# Patient Record
Sex: MALE | Race: BLACK OR AFRICAN AMERICAN | NOT HISPANIC OR LATINO | Employment: OTHER | URBAN - METROPOLITAN AREA
[De-identification: names, ages, dates, MRNs, and addresses within clinical notes are randomized per-mention and may not be internally consistent; named-entity substitution may affect disease eponyms.]

---

## 2022-05-10 PROBLEM — D56.3 BETA THALASSEMIA TRAIT: Status: ACTIVE | Noted: 2022-05-10

## 2022-05-10 NOTE — PROGRESS NOTES
Patient ID: Jeovanny Leigh is a 72 y.o. male.    Chief Complaint: Anemia      History of Present Illness  Chief complaint: anemia    HPI: 73 y/o M w/ PMHx of CKD3, HTN, HLD, CAD, CVA, 2ndary hyperparathyroidism, AAA, BPH, COPD, KASSY on CPAP referred to [facility] for anemia    Today 5/11/22: Patient here today with his wife for follow up visit. He is doing well overall. Denies fevers, chills, headaches, falls, weakness, fatigue, SOB, WOOD, CP, n/v/c/d, melena, hematochezia, hematuria. He has chronic arthritic pains and some CVA associated pains on left side of his body with minimal residual weakness on left. No other complaints or concerns reported.    PMHx: CKD3, HTN, HLD, CAD, CVA, 2ndary hyperparathyroidism, AAA, BPH, COPD, KASSY on CPAP  PSHx: PCI in 2013, lipoma  Social Hx: former smoker quit in 2013, smoked 1 ppd for 40 years, no ETOH, no drugs, no known occupational exposures  Family Hx: daughter: beta thal trait  Meds: reviewed  Allergies: ultram    Labs:  4/11/22 Cr 2.94, , iron 68, TIBC 273, Ferritin 189, folic acid 8.95, B12 429, CRP 0.04, WBC 6.21, RBC 4.45, Hgb 9.6, MCV 66.5, RDW 15.9, , Retic 2.03%, ESR 10, copper 80.4, EPO 22, SFLC ratio 1.96, kappa 61.36, lambda 31.23, hapto 151, SPEP w/ RADHIKA shows band in IgA lambda  3/18/22 Cr 2.82 Alb 4 TP 6.8 Ca 9.2 AlkPhos 90 Mg 2 Phos 3.6 WBC 5.5 RBC 4.49 Hg 9.6 MCV 68.2 RDW 16.4  ANC 3.19  2/14/22 Hg A 91.1% A2 7.3% ferritin 228  12/20/21 FOBT x3 neg  1/11/18 Hg 10.4 MCV 66.5 RBC 4.86  3/17/15 Hg 12 MCV 70 RBC 5.2  10/16/14 Cr 1.69 Hg 9.9 MCV 71 RBC 4.43 SPEP w/o RADHIKA neg UPEP 24hr neg JUDY neg    Imaging:  3/2/22 TTE LVEF 70%    Path: none      Review of Systems  CONSTITUTIONAL: no fevers, no chills, no weight loss, no fatigue, no weakness  HEMATOLOGIC: no abnormal bleeding, no abnormal bruising, no drenching night sweats  ONCOLOGIC: no new masses or lumps  HEENT: no vision loss, no tinnitus or hearing loss, no nose bleeding, no dysphagia, no  odynophagia  CVS: no chest pain, no palpitations, no dyspnea on exertion  RESP: no shortness of breath, no hemoptysis, no cough  GI: no nausea, no vomiting, no diarrhea, no constipation, no melena, no hematochezia, no hematemesis, no abdominal pain, no increase in abdominal girth  : no dysuria, no hematuria, no hesitancy, no scrotal swelling, no discharge  INTEGUMENT: no rashes, no abnormal bruising, no nail pitting, no hyperpigmentation  NEURO: no falls, no memory loss, no paresthesias or dysesthesias, no urofecal incontinence or retention, no loss of strength on any extremity  MSK: no back pain, no new joint pain but chronic arthritic pains, no joint swelling  PSYCH: no suicidal or homicidal ideation, no depression, no insomnia, no anhedonia  ENDOCRINE: no heat or cold intolerance, no polyuria, no polydipsia      Objective:      Physical Exam  Vitals:    05/11/22 1423   BP: (!) 150/91   Pulse: 77   Resp: 16   Temp: 99 °F (37.2 °C)       ECOG PS 1-2  GA: AAOx3, NAD  HEENT: NCAT, PERRLA, EOMI, no oral ulcers  LYMPH: no cervical, axillary or supraclavicular adenopathy  CVS: s1s2 RRR, no M/R/G  RESP: CTA b/l, no crackles, no wheezes or rhonchi  ABD: soft, NT, ND, BS+, no hepatosplenomegaly  EXT: no deformities, no pedal edema  SKIN: no rashes, no bruises or purpura, warm and dry  NEURO: normal mentation, strength 5/5 on all 4 extremities, no sensory deficits, walks with a holloway    Assessment:       Problem List Items Addressed This Visit        Oncology    Beta thalassemia trait - Primary            Plan:   Beta thalassemia trait is of no clinical consequence. Usually RBC will be 5.5-6 million, MCV in mid to low 60s, Hg 9-11 g/dl.  Mr Leigh also has CKD which likely leads to anemia of CKD as well. His RBC is ~4.5 million which is only minimally low  No specific intervention needed for beta thal trait. If any DEZ is considered at any point for anemia of CKD, treatment should be adjusted according to RBC counts  rather than Hg  His daughter, Mrs Arredondo, also has beta thal trait as well and follows with Dr Garcia  Faint band in IgA lambda noted on recent SPEP w/ RADHIKA, will repeat SPEP w/ RADHIKA and SFLC ratio in 3 months  RTC in 3 months with CBC, CMP, LDH, retic, SPEP w/ RADHIKA and SFLC 1 week prior to OV  Call if any questions or concerns  CC Dr. Jennifer Yañez, FNP-C

## 2022-05-11 ENCOUNTER — OFFICE VISIT (OUTPATIENT)
Dept: HEMATOLOGY/ONCOLOGY | Facility: CLINIC | Age: 72
End: 2022-05-11
Payer: COMMERCIAL

## 2022-05-11 VITALS
BODY MASS INDEX: 34.84 KG/M2 | HEIGHT: 71 IN | SYSTOLIC BLOOD PRESSURE: 150 MMHG | DIASTOLIC BLOOD PRESSURE: 91 MMHG | TEMPERATURE: 99 F | WEIGHT: 248.88 LBS | HEART RATE: 77 BPM | OXYGEN SATURATION: 95 % | RESPIRATION RATE: 16 BRPM

## 2022-05-11 DIAGNOSIS — D56.3 BETA THALASSEMIA TRAIT: Primary | ICD-10-CM

## 2022-05-11 PROCEDURE — 3077F SYST BP >= 140 MM HG: CPT | Mod: CPTII,,, | Performed by: NURSE PRACTITIONER

## 2022-05-11 PROCEDURE — 3288F PR FALLS RISK ASSESSMENT DOCUMENTED: ICD-10-PCS | Mod: CPTII,,, | Performed by: NURSE PRACTITIONER

## 2022-05-11 PROCEDURE — 4010F ACE/ARB THERAPY RXD/TAKEN: CPT | Mod: CPTII,,, | Performed by: NURSE PRACTITIONER

## 2022-05-11 PROCEDURE — 99213 PR OFFICE/OUTPT VISIT, EST, LEVL III, 20-29 MIN: ICD-10-PCS | Mod: ,,, | Performed by: NURSE PRACTITIONER

## 2022-05-11 PROCEDURE — 3080F DIAST BP >= 90 MM HG: CPT | Mod: CPTII,,, | Performed by: NURSE PRACTITIONER

## 2022-05-11 PROCEDURE — 4010F PR ACE/ARB THEARPY RXD/TAKEN: ICD-10-PCS | Mod: CPTII,,, | Performed by: NURSE PRACTITIONER

## 2022-05-11 PROCEDURE — 3080F PR MOST RECENT DIASTOLIC BLOOD PRESSURE >= 90 MM HG: ICD-10-PCS | Mod: CPTII,,, | Performed by: NURSE PRACTITIONER

## 2022-05-11 PROCEDURE — 1159F PR MEDICATION LIST DOCUMENTED IN MEDICAL RECORD: ICD-10-PCS | Mod: CPTII,,, | Performed by: NURSE PRACTITIONER

## 2022-05-11 PROCEDURE — 3008F BODY MASS INDEX DOCD: CPT | Mod: CPTII,,, | Performed by: NURSE PRACTITIONER

## 2022-05-11 PROCEDURE — 3288F FALL RISK ASSESSMENT DOCD: CPT | Mod: CPTII,,, | Performed by: NURSE PRACTITIONER

## 2022-05-11 PROCEDURE — 1101F PR PT FALLS ASSESS DOC 0-1 FALLS W/OUT INJ PAST YR: ICD-10-PCS | Mod: CPTII,,, | Performed by: NURSE PRACTITIONER

## 2022-05-11 PROCEDURE — 1126F AMNT PAIN NOTED NONE PRSNT: CPT | Mod: CPTII,,, | Performed by: NURSE PRACTITIONER

## 2022-05-11 PROCEDURE — 1101F PT FALLS ASSESS-DOCD LE1/YR: CPT | Mod: CPTII,,, | Performed by: NURSE PRACTITIONER

## 2022-05-11 PROCEDURE — 99213 OFFICE O/P EST LOW 20 MIN: CPT | Mod: ,,, | Performed by: NURSE PRACTITIONER

## 2022-05-11 PROCEDURE — 1159F MED LIST DOCD IN RCRD: CPT | Mod: CPTII,,, | Performed by: NURSE PRACTITIONER

## 2022-05-11 PROCEDURE — 3008F PR BODY MASS INDEX (BMI) DOCUMENTED: ICD-10-PCS | Mod: CPTII,,, | Performed by: NURSE PRACTITIONER

## 2022-05-11 PROCEDURE — 1126F PR PAIN SEVERITY QUANTIFIED, NO PAIN PRESENT: ICD-10-PCS | Mod: CPTII,,, | Performed by: NURSE PRACTITIONER

## 2022-05-11 PROCEDURE — 3077F PR MOST RECENT SYSTOLIC BLOOD PRESSURE >= 140 MM HG: ICD-10-PCS | Mod: CPTII,,, | Performed by: NURSE PRACTITIONER

## 2022-05-11 RX ORDER — CLOPIDOGREL BISULFATE 75 MG/1
TABLET ORAL
COMMUNITY
Start: 2022-04-12

## 2022-05-11 RX ORDER — NAPROXEN SODIUM 220 MG/1
81 TABLET, FILM COATED ORAL
COMMUNITY

## 2022-05-11 RX ORDER — CARVEDILOL 12.5 MG/1
12.5 TABLET ORAL
COMMUNITY
Start: 2022-04-11

## 2022-05-11 RX ORDER — ATORVASTATIN CALCIUM 40 MG/1
TABLET, FILM COATED ORAL
COMMUNITY
Start: 2022-04-21

## 2022-05-11 RX ORDER — TAMSULOSIN HYDROCHLORIDE 0.4 MG/1
CAPSULE ORAL
COMMUNITY
Start: 2022-02-28

## 2022-05-11 RX ORDER — OXYBUTYNIN CHLORIDE 10 MG/1
10 TABLET, EXTENDED RELEASE ORAL DAILY
COMMUNITY
Start: 2021-12-21

## 2022-05-11 RX ORDER — TERAZOSIN 2 MG/1
2 CAPSULE ORAL
COMMUNITY
Start: 2022-04-11

## 2022-05-11 RX ORDER — FUROSEMIDE 40 MG/1
40 TABLET ORAL DAILY
COMMUNITY
Start: 2022-03-02

## 2022-05-11 RX ORDER — PANTOPRAZOLE SODIUM 40 MG/1
TABLET, DELAYED RELEASE ORAL
COMMUNITY
Start: 2022-03-15

## 2022-05-11 RX ORDER — BACLOFEN 10 MG/1
TABLET ORAL
COMMUNITY
Start: 2021-12-14

## 2022-05-11 RX ORDER — AMLODIPINE BESYLATE 10 MG/1
TABLET ORAL
COMMUNITY
Start: 2022-04-21

## 2022-06-30 ENCOUNTER — HOSPITAL ENCOUNTER (OUTPATIENT)
Dept: TELEMEDICINE | Facility: HOSPITAL | Age: 72
Discharge: HOME | End: 2022-06-30
Payer: COMMERCIAL

## 2022-06-30 DIAGNOSIS — I63.9 ISCHEMIC STROKE: ICD-10-CM

## 2022-06-30 PROCEDURE — G0508 CRIT CARE TELEHEA CONSULT 60: HCPCS | Mod: GT,,, | Performed by: PSYCHIATRY & NEUROLOGY

## 2022-06-30 PROCEDURE — G0508 PR CRITICAL CARE TELEHLTH INITIAL CONSULT 60MIN: ICD-10-PCS | Mod: GT,,, | Performed by: PSYCHIATRY & NEUROLOGY

## 2022-06-30 NOTE — SUBJECTIVE & OBJECTIVE
Woke up with symptoms?: no    Recent bleeding noted: {Yes / No / Unknown:74}  Does the patient take any Blood Thinners? yes  Medications: Antiplatelets:  aspirin and clopidogrel/Plavix    History reviewed. No pertinent past medical history.  Past Surgical History:   Procedure Laterality Date    NECK SURGERY       Social History     Tobacco Use    Smoking status: Former Smoker    Smokeless tobacco: Never Used   Substance Use Topics    Alcohol use: Not Currently    Drug use: Never     Family History   Problem Relation Age of Onset    Stroke Mother     Heart attack Brother     Stroke Maternal Grandmother     Heart attack Paternal Grandfather      Current Outpatient Medications on File Prior to Encounter   Medication Sig Dispense Refill    amLODIPine (NORVASC) 10 MG tablet TAKE ONE TABLET BY MOUTH ONCE DAILY FOR PRESSURE (REPLACE LOTREL)      aspirin 81 MG Chew Take 81 mg by mouth.      atorvastatin (LIPITOR) 40 MG tablet TAKE ONE TABLET BY MOUTH ONCE DAILY AT NIGHT FOR CHOLESTEROL      baclofen (LIORESAL) 10 MG tablet TAKE ONE TABLET BY MOUTH TWICE DAILY AS NEEDED FOR MUSCLE STRAIN      carvediloL (COREG) 12.5 MG tablet Take 12.5 mg by mouth.      clopidogreL (PLAVIX) 75 mg tablet TAKE ONE TABLET BY MOUTH ONCE DAILY FOR CIRCULATION / stroke prevention      furosemide (LASIX) 40 MG tablet Take 40 mg by mouth once daily.      oxybutynin (DITROPAN-XL) 10 MG 24 hr tablet Take 10 mg by mouth once daily.      pantoprazole (PROTONIX) 40 MG tablet TAKE ONE TABLET BY MOUTH ONCE DAILY FOR ACID REFLUX      tamsulosin (FLOMAX) 0.4 mg Cap TAKE ONE CAPSULE BY MOUTH ONCE DAILY FOR prostate      terazosin (HYTRIN) 2 MG capsule Take 2 mg by mouth.       No current facility-administered medications on file prior to encounter.       Allergies: Tramadol     Review of Systems   All other systems reviewed and are negative.    Objective:   Vitals: There were no vitals taken for this visit. {TELESTROKE  VITALS:05018}    CT READ: Yes  No hemmorhage. No mass effect. No early infarct signs.  ; remote R subcortical, age indeterminate R ant limb IC, leukoaraiosis  Physical Exam  Vitals reviewed.

## 2022-07-01 DIAGNOSIS — E04.1 THYROID CYST: Primary | ICD-10-CM

## 2022-07-01 NOTE — CONSULTS
[facility]  Vascular Neurology  Comprehensive Stroke Center  TeleVascular Neurology Acute Consultation Note      Consults    Consulting Provider: FANNY MIN  Current Providers  No providers found    Patient Location: [facility] TELEMEDICINE ED [facility] Emergency Department  Spoke hospital nurse at bedside with patient assisting consultant.     Patient information was obtained from patient.         Assessment/Plan:       Diagnoses:   Ischemic stroke  Pure sensory ischemic stroke best localizing to R thalamus.  Not a tPA nor interventional candidate as NIHSS only 1.    Antithrombotics for secondary stroke prevention: Antiplatelets: Aspirin: 81 mg daily  Clopidogrel: 75 mg daily    Statins for secondary stroke prevention and hyperlipidemia, if present:   Statins: Atorvastatin- 80 mg daily    Aggressive risk factor modification: HTN, HLD, Diet, Exercise     Rehab efforts: The patient has been evaluated by a stroke team provider and the therapy needs have been fully considered based off the presenting complaints and exam findings. The following therapy evaluations are needed: PT evaluate and treat, OT evaluate and treat, SLP evaluate and treat    Diagnostics ordered/pending: CTA Head to assess vasculature , CTA Neck/Arch to assess vasculature, HgbA1C to assess blood glucose levels, Lipid Profile to assess cholesterol levels, MRI head without contrast to assess brain parenchyma, TTE to assess cardiac function/status     VTE prophylaxis: Enoxaparin 40 mg SQ every 24 hours    BP parameters: Infarct: No intervention, SBP <220    IVFs: NS @ 75cc/hr        STROKE DOCUMENTATION     Acute Stroke Times:   Acute Stroke Times   Symptom Onset Date: 06/30/22  Symptom Onset Time: 1815  Stroke Team Arrival Time: 1850  CT Interpretation Time: 1855    NIH Scale:  Interval: baseline  1a. Level of Consciousness: 0-->Alert, keenly responsive  1b. LOC Questions:  0-->Answers both questions correctly  1c. LOC Commands: 0-->Performs both tasks correctly  2. Best Gaze: 0-->Normal  3. Visual: 0-->No visual loss  4. Facial Palsy: 0-->Normal symmetrical movements  5a. Motor Arm, Left: 0-->No drift, limb holds 90 (or 45) degrees for full 10 secs  5b. Motor Arm, Right: 0-->No drift, limb holds 90 (or 45) degrees for full 10 secs  6a. Motor Leg, Left: 0-->No drift, leg holds 30 degree position for full 5 secs  6b. Motor Leg, Right: 0-->No drift, leg holds 30 degree position for full 5 secs  7. Limb Ataxia: 0-->Absent  8. Sensory: 1-->Mild-to-moderate sensory loss, patient feels pinprick is less sharp or is dull on the affected side, or there is a loss of superficial pain with pinprick, but patient is aware of being touched  9. Best Language: 0-->No aphasia, normal  10. Dysarthria: 0-->Normal  11. Extinction and Inattention (formerly Neglect): 0-->No abnormality  Total (NIH Stroke Scale): 1     Modified [place] Coma Scale:    ABCD2 Score:    [id number] Score:   HAS -BLED Score:   ICH Score:   Hunt & De La Torre Classification:       There were no vitals taken for this visit.  Alteplase Eligible?: No  Alteplase Recommendation: Alteplase not recommended due to Mild Non-Disabling Symptoms  Possible Interventional Revascularization Candidate? No; no significant neurologic deficit (NIHSS <6)  and No; at this time symptoms not suggestive of large vessel occlusion    Disposition Recommendation: admit to inpatient    Subjective:     History of Present Illness:  71 y/o AAM with L sided numbness onset 6:15p.  He is improving but not back to baseline.         Woke up with symptoms?: no    Recent bleeding noted: no  Does the patient take any Blood Thinners? yes  Medications: Antiplatelets:  aspirin and clopidogrel/Plavix    History reviewed. No pertinent past medical history.  Past Surgical History:   Procedure Laterality Date    NECK SURGERY       Social History     Tobacco Use    Smoking  status: Former Smoker    Smokeless tobacco: Never Used   Substance Use Topics    Alcohol use: Not Currently    Drug use: Never     Family History   Problem Relation Age of Onset    Stroke Mother     Heart attack Brother     Stroke Maternal Grandmother     Heart attack Paternal Grandfather      Current Outpatient Medications on File Prior to Encounter   Medication Sig Dispense Refill    amLODIPine (NORVASC) 10 MG tablet TAKE ONE TABLET BY MOUTH ONCE DAILY FOR PRESSURE (REPLACE LOTREL)      aspirin 81 MG Chew Take 81 mg by mouth.      atorvastatin (LIPITOR) 40 MG tablet TAKE ONE TABLET BY MOUTH ONCE DAILY AT NIGHT FOR CHOLESTEROL      baclofen (LIORESAL) 10 MG tablet TAKE ONE TABLET BY MOUTH TWICE DAILY AS NEEDED FOR MUSCLE STRAIN      carvediloL (COREG) 12.5 MG tablet Take 12.5 mg by mouth.      clopidogreL (PLAVIX) 75 mg tablet TAKE ONE TABLET BY MOUTH ONCE DAILY FOR CIRCULATION / stroke prevention      furosemide (LASIX) 40 MG tablet Take 40 mg by mouth once daily.      oxybutynin (DITROPAN-XL) 10 MG 24 hr tablet Take 10 mg by mouth once daily.      pantoprazole (PROTONIX) 40 MG tablet TAKE ONE TABLET BY MOUTH ONCE DAILY FOR ACID REFLUX      tamsulosin (FLOMAX) 0.4 mg Cap TAKE ONE CAPSULE BY MOUTH ONCE DAILY FOR prostate      terazosin (HYTRIN) 2 MG capsule Take 2 mg by mouth.       No current facility-administered medications on file prior to encounter.       Allergies: Tramadol     Review of Systems   All other systems reviewed and are negative.    Objective:   Vitals:     CT READ: Yes  No hemmorhage. No mass effect. No early infarct signs.  ; remote R subcortical, age indeterminate R ant limb IC, leukoaraiosis  Physical Exam  Vitals reviewed.               Recommended the emergency room physician to have a brief discussion with the patient and/or family if available regarding the  risks and benefits of treatment, and to briefly document the occurrence of that discussion in his clinical  encounter note.     The attending portion of this evaluation, treatment, and documentation was performed per Tato Rg MD via audiovisual.    Billing code:  (time dependent stroke, complex case, unstable patient, hemorrhages, any intervention, some mimics)    · This patient has a very critical neurological condition/illness, with very high morbidity and mortality.  · There is a very high probability for acute neurological change leading to clinical and possibly life-threatening deterioration requiring highest level of physician preparedness for urgent intervention.  · There is possibility that this condition will require treatment with high risk medications as quickly as possible.  · There is also a possibility that the patient may benefit from further, more advance complex therapies (e.g. endovascular therapy) that will require prompt diagnosis and care.  · Care was coordinated with other physicians involved in the patient's care.  · Radiologic studies and laboratory data were reviewed and interpreted, and plan of care was re-assessed based on the results.  · Diagnosis, treatment options and prognosis may have been discussed with the patient and/or family members or caregiver.  · Further advanced medical management and further evaluation is warranted for his care.      In your opinion, this was a: Tier 1 Van Negative    Consult End Time: 7:05 PM     Tato Rg MD  Comprehensive Stroke Center  Vascular Neurology   [facility]

## 2022-07-01 NOTE — ASSESSMENT & PLAN NOTE
Pure sensory ischemic stroke best localizing to R thalamus.  Not a tPA nor interventional candidate as NIHSS only 1.    Antithrombotics for secondary stroke prevention: Antiplatelets: Aspirin: 81 mg daily  Clopidogrel: 75 mg daily    Statins for secondary stroke prevention and hyperlipidemia, if present:   Statins: Atorvastatin- 80 mg daily    Aggressive risk factor modification: HTN, HLD, Diet, Exercise     Rehab efforts: The patient has been evaluated by a stroke team provider and the therapy needs have been fully considered based off the presenting complaints and exam findings. The following therapy evaluations are needed: PT evaluate and treat, OT evaluate and treat, SLP evaluate and treat    Diagnostics ordered/pending: CTA Head to assess vasculature , CTA Neck/Arch to assess vasculature, HgbA1C to assess blood glucose levels, Lipid Profile to assess cholesterol levels, MRI head without contrast to assess brain parenchyma, TTE to assess cardiac function/status     VTE prophylaxis: Enoxaparin 40 mg SQ every 24 hours    BP parameters: Infarct: No intervention, SBP <220    IVFs: NS @ 75cc/hr

## 2022-08-24 NOTE — PROGRESS NOTES
Patient ID: Jeovanny Leigh is a 72 y.o. male.    Chief Complaint: Anemia (Follow up with labs)      History of Present Illness  Chief complaint: anemia    HPI: 73 y/o M w/ PMHx of CKD3, HTN, HLD, CAD, CVA, 2ndary hyperparathyroidism, AAA, BPH, COPD, KASSY on CPAP referred to [facility] for anemia    Today 8/29/22: Patient here today with his wife for follow up visit. He is doing well overall. Denies fevers, chills, headaches, falls, weakness, SOB, WOOD, CP, n/v/c/d, melena, hematochezia, hematuria. He has chronic arthritic pains and some CVA associated pains on left side of his body with minimal residual weakness on left. He reports some fatigue since having Covid 4 weeks ago. No other complaints or concerns reported.    PMHx: CKD3, HTN, HLD, CAD, CVA, 2ndary hyperparathyroidism, AAA, BPH, COPD, KASSY on CPAP  PSHx: PCI in 2013, lipoma  Social Hx: former smoker quit in 2013, smoked 1 ppd for 40 years, no ETOH, no drugs, no known occupational exposures  Family Hx: daughter: beta thal trait  Meds: reviewed  Allergies: ultram    Labs:  8/11/22 Cr 2.67, Alb 3.7, TP 6.7, , WBC 4.45, Hgb 8.7, MCV 67.2, RDW 16, , ANC 2.48, SFLC ratio 1.80, kappa 91.09, lambda  50.56, suspicious band in the gamma region 0.36 g/dL. RADHIKA shows a band in IgA lambda    4/11/22 Cr 2.94, , iron 68, TIBC 273, Ferritin 189, folic acid 8.95, B12 429, CRP 0.04, WBC 6.21, RBC 4.45, Hgb 9.6, MCV 66.5, RDW 15.9, , Retic 2.03%, ESR 10, copper 80.4, EPO 22, SFLC ratio 1.96, kappa 61.36, lambda 31.23, hapto 151, SPEP w/ RADHIKA shows band in IgA lambda  3/18/22 Cr 2.82 Alb 4 TP 6.8 Ca 9.2 AlkPhos 90 Mg 2 Phos 3.6 WBC 5.5 RBC 4.49 Hg 9.6 MCV 68.2 RDW 16.4  ANC 3.19  2/14/22 Hg A 91.1% A2 7.3% ferritin 228  12/20/21 FOBT x3 neg  1/11/18 Hg 10.4 MCV 66.5 RBC 4.86  3/17/15 Hg 12 MCV 70 RBC 5.2  10/16/14 Cr 1.69 Hg 9.9 MCV 71 RBC 4.43 SPEP w/o RADHIKA neg UPEP 24hr neg JUDY neg    Imaging:  3/2/22 TTE LVEF 70%    Path: none      Review of  Systems  CONSTITUTIONAL: no fevers, no chills, no weight loss, no fatigue, no weakness  HEMATOLOGIC: no abnormal bleeding, no abnormal bruising, no drenching night sweats  ONCOLOGIC: no new masses or lumps  HEENT: no vision loss, no tinnitus or hearing loss, no nose bleeding, no dysphagia, no odynophagia  CVS: no chest pain, no palpitations, no dyspnea on exertion  RESP: no shortness of breath, no hemoptysis, no cough  GI: no nausea, no vomiting, no diarrhea, no constipation, no melena, no hematochezia, no hematemesis, no abdominal pain, no increase in abdominal girth  : no dysuria, no hematuria, no hesitancy, no scrotal swelling, no discharge  INTEGUMENT: no rashes, no abnormal bruising, no nail pitting, no hyperpigmentation  NEURO: no falls, no memory loss, no paresthesias or dysesthesias, no urofecal incontinence or retention, no loss of strength on any extremity  MSK: no back pain, no new joint pain but chronic arthritic pains, no joint swelling  PSYCH: no suicidal or homicidal ideation, no depression, no insomnia, no anhedonia  ENDOCRINE: no heat or cold intolerance, no polyuria, no polydipsia      Objective:      Physical Exam  Vitals:    08/29/22 1316   BP: 133/87   Pulse: 84   Resp: 16   Temp: 98.4 °F (36.9 °C)       ECOG PS 1-2  GA: AAOx3, NAD  HEENT: NCAT, PERRLA, EOMI, no oral ulcers  LYMPH: no cervical, axillary or supraclavicular adenopathy  CVS: s1s2 RRR, no M/R/G  RESP: CTA b/l, no crackles, no wheezes or rhonchi  ABD: soft, NT, ND, BS+, no hepatosplenomegaly  EXT: no deformities, no pedal edema  SKIN: no rashes, no bruises or purpura, warm and dry  NEURO: normal mentation, strength 5/5 on all 4 extremities, no sensory deficits, walks with a holloway    Assessment:       Problem List Items Addressed This Visit          Oncology    Beta thalassemia trait - Primary    MGUS (monoclonal gammopathy of unknown significance)         Plan:   Beta thalassemia trait is of no clinical consequence. Usually RBC will  be 5.5-6 million, MCV in mid to low 60s, Hg 9-11 g/dl.  Mr Lu also has CKD which likely leads to anemia of CKD as well. His RBC is ~4.5 million which is only minimally low  No specific intervention needed for beta thal trait. If any DEZ is considered at any point for anemia of CKD, treatment should be adjusted according to RBC counts rather than Hg  His daughter, Mrs Arredondo, also has beta thal trait as well and follows with Dr Jose Packer in IgA lambda noted on recent SPEP w/ RADHIKA, will repeat SPEP w/ RADHIKA and SFLC ratio in 3 months  RTC in 3 months with CBC, CMP, LDH, retic, SPEP w/ RADHIKA and SFLC 1 week prior to OV  Call if any questions or concerns  CC Dr. Jennifer Yañez, FNP-C

## 2022-08-29 ENCOUNTER — OFFICE VISIT (OUTPATIENT)
Dept: HEMATOLOGY/ONCOLOGY | Facility: CLINIC | Age: 72
End: 2022-08-29
Payer: COMMERCIAL

## 2022-08-29 VITALS
SYSTOLIC BLOOD PRESSURE: 133 MMHG | WEIGHT: 247.56 LBS | OXYGEN SATURATION: 97 % | HEART RATE: 84 BPM | TEMPERATURE: 98 F | DIASTOLIC BLOOD PRESSURE: 87 MMHG | RESPIRATION RATE: 16 BRPM | HEIGHT: 70 IN | BODY MASS INDEX: 35.44 KG/M2

## 2022-08-29 DIAGNOSIS — D56.3 BETA THALASSEMIA TRAIT: Primary | ICD-10-CM

## 2022-08-29 DIAGNOSIS — D47.2 MGUS (MONOCLONAL GAMMOPATHY OF UNKNOWN SIGNIFICANCE): ICD-10-CM

## 2022-08-29 PROCEDURE — 99213 OFFICE O/P EST LOW 20 MIN: CPT | Mod: ,,, | Performed by: NURSE PRACTITIONER

## 2022-08-29 PROCEDURE — 1101F PT FALLS ASSESS-DOCD LE1/YR: CPT | Mod: CPTII,,, | Performed by: NURSE PRACTITIONER

## 2022-08-29 PROCEDURE — 1160F RVW MEDS BY RX/DR IN RCRD: CPT | Mod: CPTII,,, | Performed by: NURSE PRACTITIONER

## 2022-08-29 PROCEDURE — 1101F PR PT FALLS ASSESS DOC 0-1 FALLS W/OUT INJ PAST YR: ICD-10-PCS | Mod: CPTII,,, | Performed by: NURSE PRACTITIONER

## 2022-08-29 PROCEDURE — 3008F BODY MASS INDEX DOCD: CPT | Mod: CPTII,,, | Performed by: NURSE PRACTITIONER

## 2022-08-29 PROCEDURE — 3079F DIAST BP 80-89 MM HG: CPT | Mod: CPTII,,, | Performed by: NURSE PRACTITIONER

## 2022-08-29 PROCEDURE — 1159F MED LIST DOCD IN RCRD: CPT | Mod: CPTII,,, | Performed by: NURSE PRACTITIONER

## 2022-08-29 PROCEDURE — 1160F PR REVIEW ALL MEDS BY PRESCRIBER/CLIN PHARMACIST DOCUMENTED: ICD-10-PCS | Mod: CPTII,,, | Performed by: NURSE PRACTITIONER

## 2022-08-29 PROCEDURE — 3288F FALL RISK ASSESSMENT DOCD: CPT | Mod: CPTII,,, | Performed by: NURSE PRACTITIONER

## 2022-08-29 PROCEDURE — 4010F ACE/ARB THERAPY RXD/TAKEN: CPT | Mod: CPTII,,, | Performed by: NURSE PRACTITIONER

## 2022-08-29 PROCEDURE — 3079F PR MOST RECENT DIASTOLIC BLOOD PRESSURE 80-89 MM HG: ICD-10-PCS | Mod: CPTII,,, | Performed by: NURSE PRACTITIONER

## 2022-08-29 PROCEDURE — 1126F PR PAIN SEVERITY QUANTIFIED, NO PAIN PRESENT: ICD-10-PCS | Mod: CPTII,,, | Performed by: NURSE PRACTITIONER

## 2022-08-29 PROCEDURE — 3075F PR MOST RECENT SYSTOLIC BLOOD PRESS GE 130-139MM HG: ICD-10-PCS | Mod: CPTII,,, | Performed by: NURSE PRACTITIONER

## 2022-08-29 PROCEDURE — 4010F PR ACE/ARB THEARPY RXD/TAKEN: ICD-10-PCS | Mod: CPTII,,, | Performed by: NURSE PRACTITIONER

## 2022-08-29 PROCEDURE — 3075F SYST BP GE 130 - 139MM HG: CPT | Mod: CPTII,,, | Performed by: NURSE PRACTITIONER

## 2022-08-29 PROCEDURE — 3288F PR FALLS RISK ASSESSMENT DOCUMENTED: ICD-10-PCS | Mod: CPTII,,, | Performed by: NURSE PRACTITIONER

## 2022-08-29 PROCEDURE — 1126F AMNT PAIN NOTED NONE PRSNT: CPT | Mod: CPTII,,, | Performed by: NURSE PRACTITIONER

## 2022-08-29 PROCEDURE — 99213 PR OFFICE/OUTPT VISIT, EST, LEVL III, 20-29 MIN: ICD-10-PCS | Mod: ,,, | Performed by: NURSE PRACTITIONER

## 2022-08-29 PROCEDURE — 3008F PR BODY MASS INDEX (BMI) DOCUMENTED: ICD-10-PCS | Mod: CPTII,,, | Performed by: NURSE PRACTITIONER

## 2022-08-29 PROCEDURE — 1159F PR MEDICATION LIST DOCUMENTED IN MEDICAL RECORD: ICD-10-PCS | Mod: CPTII,,, | Performed by: NURSE PRACTITIONER

## 2022-08-29 RX ORDER — PANTOPRAZOLE SODIUM 20 MG/1
TABLET, DELAYED RELEASE ORAL
COMMUNITY
Start: 2022-03-03

## 2022-08-29 RX ORDER — AMLODIPINE BESYLATE 10 MG/1
TABLET ORAL
COMMUNITY
Start: 2022-05-02

## 2022-08-29 RX ORDER — BACLOFEN 10 MG/1
TABLET ORAL
COMMUNITY
Start: 2022-03-03

## 2022-10-26 DIAGNOSIS — E04.1 CYST OF THYROID: Primary | ICD-10-CM

## 2022-11-01 ENCOUNTER — HOSPITAL ENCOUNTER (OUTPATIENT)
Dept: RADIOLOGY | Facility: HOSPITAL | Age: 72
Discharge: HOME | End: 2022-11-01
Attending: OTOLARYNGOLOGY
Payer: COMMERCIAL

## 2022-11-01 DIAGNOSIS — E04.1 CYST OF THYROID: ICD-10-CM

## 2022-11-01 PROCEDURE — 76536 US EXAM OF HEAD AND NECK: CPT | Mod: TC

## 2022-12-14 ENCOUNTER — HOSPITAL ENCOUNTER (OUTPATIENT)
Dept: RADIOLOGY | Facility: HOSPITAL | Age: 72
Discharge: HOME | End: 2022-12-14
Attending: OTOLARYNGOLOGY
Payer: COMMERCIAL

## 2022-12-14 DIAGNOSIS — E04.1 THYROID NODULE: ICD-10-CM

## 2022-12-14 PROCEDURE — 60100 BIOPSY OF THYROID: CPT

## 2022-12-14 PROCEDURE — 76942 ECHO GUIDE FOR BIOPSY: CPT | Mod: TC

## 2022-12-14 PROCEDURE — 88172 CYTP DX EVAL FNA 1ST EA SITE: CPT | Mod: 59 | Performed by: OTOLARYNGOLOGY

## 2022-12-14 RX ORDER — LIDOCAINE HYDROCHLORIDE 20 MG/ML
INJECTION, SOLUTION EPIDURAL; INFILTRATION; INTRACAUDAL; PERINEURAL
Status: DISPENSED
Start: 2022-12-14 | End: 2022-12-14

## 2022-12-16 LAB
DHEA SERPL-MCNC: NORMAL
ESTROGEN SERPL-MCNC: NORMAL PG/ML
INSULIN SERPL-ACNC: NORMAL U[IU]/ML
LAB AP CLINICAL INFORMATION: NORMAL
LAB AP GROSS DESCRIPTION: NORMAL
LAB AP INTRA OP: NORMAL
LAB AP REPORT FOOTNOTES: NORMAL
T3RU NFR SERPL: NORMAL %

## 2023-03-28 ENCOUNTER — PATIENT MESSAGE (OUTPATIENT)
Dept: RESEARCH | Facility: HOSPITAL | Age: 73
End: 2023-03-28
Payer: COMMERCIAL

## 2023-05-18 ENCOUNTER — LAB VISIT (OUTPATIENT)
Dept: LAB | Facility: HOSPITAL | Age: 73
End: 2023-05-18
Attending: INTERNAL MEDICINE
Payer: MEDICARE

## 2023-05-18 DIAGNOSIS — R42 DIZZINESS AND GIDDINESS: ICD-10-CM

## 2023-05-18 DIAGNOSIS — R55 SYNCOPE AND COLLAPSE: ICD-10-CM

## 2023-05-18 DIAGNOSIS — I20.89 ANGINA DECUBITUS: Primary | ICD-10-CM

## 2023-05-18 DIAGNOSIS — R60.9 EDEMA, UNSPECIFIED TYPE: ICD-10-CM

## 2023-05-18 DIAGNOSIS — R06.02 SHORTNESS OF BREATH: ICD-10-CM

## 2023-05-18 DIAGNOSIS — R53.81 DEBILITY: ICD-10-CM

## 2023-05-18 DIAGNOSIS — R00.2 PALPITATIONS: ICD-10-CM

## 2023-05-18 DIAGNOSIS — R06.00 DYSPNEA, UNSPECIFIED TYPE: ICD-10-CM

## 2023-05-18 DIAGNOSIS — K21.9 GASTROESOPHAGEAL REFLUX DISEASE, UNSPECIFIED WHETHER ESOPHAGITIS PRESENT: ICD-10-CM

## 2023-05-18 DIAGNOSIS — E78.00 PURE HYPERCHOLESTEROLEMIA: ICD-10-CM

## 2023-05-18 LAB
ALBUMIN SERPL-MCNC: 3.6 G/DL (ref 3.4–4.8)
ALBUMIN/GLOB SERPL: 1.4 RATIO (ref 1.1–2)
ALP SERPL-CCNC: 75 UNIT/L (ref 40–150)
ALT SERPL-CCNC: 11 UNIT/L (ref 0–55)
AST SERPL-CCNC: 13 UNIT/L (ref 5–34)
BASOPHILS # BLD AUTO: 0.04 X10(3)/MCL
BASOPHILS NFR BLD AUTO: 0.7 %
BILIRUBIN DIRECT+TOT PNL SERPL-MCNC: 0.6 MG/DL
BUN SERPL-MCNC: 45.6 MG/DL (ref 8.4–25.7)
CALCIUM SERPL-MCNC: 9 MG/DL (ref 8.8–10)
CHLORIDE SERPL-SCNC: 105 MMOL/L (ref 98–107)
CHOLEST SERPL-MCNC: 147 MG/DL
CHOLEST/HDLC SERPL: 4 {RATIO} (ref 0–5)
CO2 SERPL-SCNC: 21 MMOL/L (ref 23–31)
CREAT SERPL-MCNC: 6.39 MG/DL (ref 0.73–1.18)
EOSINOPHIL # BLD AUTO: 0.05 X10(3)/MCL (ref 0–0.9)
EOSINOPHIL NFR BLD AUTO: 0.9 %
ERYTHROCYTE [DISTWIDTH] IN BLOOD BY AUTOMATED COUNT: 16.9 % (ref 11.5–17)
GFR SERPLBLD CREATININE-BSD FMLA CKD-EPI: 9 MLS/MIN/1.73/M2
GLOBULIN SER-MCNC: 2.6 GM/DL (ref 2.4–3.5)
GLUCOSE SERPL-MCNC: 99 MG/DL (ref 82–115)
HCT VFR BLD AUTO: 26.7 % (ref 42–52)
HDLC SERPL-MCNC: 41 MG/DL (ref 35–60)
HGB BLD-MCNC: 8.2 G/DL (ref 14–18)
IMM GRANULOCYTES # BLD AUTO: 0.01 X10(3)/MCL (ref 0–0.04)
IMM GRANULOCYTES NFR BLD AUTO: 0.2 %
LDLC SERPL CALC-MCNC: 89 MG/DL (ref 50–140)
LYMPHOCYTES # BLD AUTO: 1.71 X10(3)/MCL (ref 0.6–4.6)
LYMPHOCYTES NFR BLD AUTO: 31.9 %
MAGNESIUM SERPL-MCNC: 1.8 MG/DL (ref 1.6–2.6)
MCH RBC QN AUTO: 21.2 PG (ref 27–31)
MCHC RBC AUTO-ENTMCNC: 30.7 G/DL (ref 33–36)
MCV RBC AUTO: 69.2 FL (ref 80–94)
MONOCYTES # BLD AUTO: 0.41 X10(3)/MCL (ref 0.1–1.3)
MONOCYTES NFR BLD AUTO: 7.6 %
NEUTROPHILS # BLD AUTO: 3.14 X10(3)/MCL (ref 2.1–9.2)
NEUTROPHILS NFR BLD AUTO: 58.7 %
NRBC BLD AUTO-RTO: 0 %
PLATELET # BLD AUTO: 153 X10(3)/MCL (ref 130–400)
PMV BLD AUTO: 10.1 FL (ref 7.4–10.4)
POTASSIUM SERPL-SCNC: 4.2 MMOL/L (ref 3.5–5.1)
PROT SERPL-MCNC: 6.2 GM/DL (ref 5.8–7.6)
RBC # BLD AUTO: 3.86 X10(6)/MCL (ref 4.7–6.1)
SODIUM SERPL-SCNC: 137 MMOL/L (ref 136–145)
TRIGL SERPL-MCNC: 87 MG/DL (ref 34–140)
VLDLC SERPL CALC-MCNC: 17 MG/DL
WBC # SPEC AUTO: 5.36 X10(3)/MCL (ref 4.5–11.5)

## 2023-05-18 PROCEDURE — 80053 COMPREHEN METABOLIC PANEL: CPT

## 2023-05-18 PROCEDURE — 80061 LIPID PANEL: CPT

## 2023-05-18 PROCEDURE — 85025 COMPLETE CBC W/AUTO DIFF WBC: CPT

## 2023-05-18 PROCEDURE — 36415 COLL VENOUS BLD VENIPUNCTURE: CPT

## 2023-05-18 PROCEDURE — 83735 ASSAY OF MAGNESIUM: CPT

## 2023-12-22 ENCOUNTER — OUTSIDE PLACE OF SERVICE (OUTPATIENT)
Dept: ADMINISTRATIVE | Facility: OTHER | Age: 73
End: 2023-12-22
Payer: MEDICARE

## 2023-12-22 DIAGNOSIS — D69.6 THROMBOCYTOPENIA: Primary | ICD-10-CM

## 2024-10-02 ENCOUNTER — OFFICE VISIT (OUTPATIENT)
Dept: HEMATOLOGY/ONCOLOGY | Facility: CLINIC | Age: 74
End: 2024-10-02
Payer: MEDICARE

## 2024-10-02 VITALS
WEIGHT: 207 LBS | DIASTOLIC BLOOD PRESSURE: 89 MMHG | RESPIRATION RATE: 14 BRPM | OXYGEN SATURATION: 97 % | BODY MASS INDEX: 29.63 KG/M2 | SYSTOLIC BLOOD PRESSURE: 185 MMHG | HEART RATE: 62 BPM | HEIGHT: 70 IN

## 2024-10-02 DIAGNOSIS — D50.9 MICROCYTIC ANEMIA: ICD-10-CM

## 2024-10-02 DIAGNOSIS — D69.6 THROMBOCYTOPENIA: Primary | ICD-10-CM

## 2024-10-02 DIAGNOSIS — D47.2 MGUS (MONOCLONAL GAMMOPATHY OF UNKNOWN SIGNIFICANCE): ICD-10-CM

## 2024-10-02 RX ORDER — NITROGLYCERIN 0.4 MG/1
TABLET SUBLINGUAL
COMMUNITY

## 2024-10-02 RX ORDER — BENAZEPRIL HYDROCHLORIDE 40 MG/1
TABLET ORAL
COMMUNITY

## 2024-10-02 RX ORDER — APIXABAN 2.5 MG/1
TABLET, FILM COATED ORAL
COMMUNITY

## 2024-10-02 RX ORDER — FINASTERIDE 5 MG/1
5 TABLET, FILM COATED ORAL
COMMUNITY

## 2024-10-02 RX ORDER — SODIUM BICARBONATE 650 MG/1
TABLET ORAL
COMMUNITY

## 2024-10-02 RX ORDER — CALCITRIOL 0.25 UG/1
1 CAPSULE ORAL
COMMUNITY
Start: 2022-04-11

## 2024-10-02 RX ORDER — HYDRALAZINE HYDROCHLORIDE 50 MG/1
TABLET, FILM COATED ORAL
COMMUNITY

## 2024-10-02 RX ORDER — CHOLECALCIFEROL (VITAMIN D3) 25 MCG
TABLET ORAL
COMMUNITY

## 2024-10-02 RX ORDER — METOPROLOL SUCCINATE 50 MG/1
50 TABLET, EXTENDED RELEASE ORAL NIGHTLY
COMMUNITY
Start: 2024-09-16

## 2024-10-02 NOTE — PROGRESS NOTES
Referring provider:  Dr. Choi    Reason for consultation:  Thrombocytopenia          HPI:    Patient was a 74-year-old was seen by his PCP and was found to have thrombocytopenia prompting this referral.  He presented to clinic on 10/02/2024 to establish care.    Of note patient was established in our clinic however was lost to follow-up after his last visit with us in August 2022    Interval history:    Today, 10/02/2024, patient reports symptoms of bilateral knee pain and low back pain but denies any other acute concerns.  He denies any symptoms of blood in his stools, dark tarry stools, easy bruising or bleeding.  He denies any chest pain, chest tightness, decreased appetite or weight loss he denies any recent new medications, ER or hospital visits        Pathology   12/26/2023 pathology review of smear:  Moderate normochromic/hypochromic microcytic anemia with normal red blood cell count and few target cells, most suggestive of hemoglobinopathy.  If hemoglobin electrophoresis studies and normal, recommend serum iron studies for additional evaluation.  Moderately thrombocytopenia, no platelet clumps or aggregates identified.  Normal white cell count with normal essential differential slight reactive monocytosis, no blasts or lymphoma cells identified    Past Medical History:   Diagnosis Date    Dyslipidemia     HTN (hypertension)     Stroke        Past Surgical History:   Procedure Laterality Date    NECK SURGERY         Family History   Problem Relation Name Age of Onset    Stroke Mother      Heart attack Brother      Stroke Maternal Grandmother      Heart attack Paternal Grandfather         Social History     Socioeconomic History    Marital status:    Tobacco Use    Smoking status: Former    Smokeless tobacco: Never   Substance and Sexual Activity    Alcohol use: Not Currently    Drug use: Never    Sexual activity: Not Currently       Current Outpatient Medications   Medication Sig Dispense Refill     amLODIPine (NORVASC) 10 MG tablet TAKE ONE TABLET BY MOUTH ONCE DAILY FOR PRESSURE (REPLACE LOTREL)      aspirin 81 MG Chew Take 81 mg by mouth.      atorvastatin (LIPITOR) 40 MG tablet TAKE ONE TABLET BY MOUTH ONCE DAILY AT NIGHT FOR CHOLESTEROL      benazepriL (LOTENSIN) 40 MG tablet 1 tablet Orally Once a day      calcitRIOL (ROCALTROL) 0.25 MCG Cap 1 capsule.      clopidogreL (PLAVIX) 75 mg tablet TAKE ONE TABLET BY MOUTH ONCE DAILY FOR CIRCULATION / stroke prevention      ELIQUIS 2.5 mg Tab 1 tablet Orally Twice a day      finasteride (PROSCAR) 5 mg tablet Take 5 mg by mouth.      hydrALAZINE (APRESOLINE) 50 MG tablet 1 tablet with food Orally Twice a day      metoprolol succinate (TOPROL-XL) 50 MG 24 hr tablet Take 50 mg by mouth every evening.      nitroGLYCERIN (NITROSTAT) 0.4 MG SL tablet 1 tablet per chest pain, repeat every 5 mins x 3 Sublingual . as needed for 30 days      vitamin D (VITAMIN D3) 1000 units Tab 1 tablet Orally Once a day for 90 days      acetaminophen with codeine (ACETAMINOPHEN-CODEINE ORAL) 1 tablet EVERY 8 HOURS (route: oral) (Patient not taking: Reported on 10/2/2024)      amLODIPine (NORVASC) 10 MG tablet 1 tablet DAILY (route: oral)      ASPIRIN CHILDRENS ORAL 1 tablet DAILY (route: oral)      baclofen (LIORESAL) 10 MG tablet TAKE ONE TABLET BY MOUTH TWICE DAILY AS NEEDED FOR MUSCLE STRAIN      baclofen (LIORESAL) 10 MG tablet 1 tablet 2 TIMES DAILY (route: oral)      carvediloL (COREG) 12.5 MG tablet Take 12.5 mg by mouth.      CARVEDILOL ORAL 0.5 tablet DAILY (route: oral)      clopidogrel bisulfate (CLOPIDOGREL ORAL) 1 tablet DAILY (route: oral)      furosemide (LASIX) 40 MG tablet Take 40 mg by mouth once daily.      GABAPENTIN ORAL 2 capsule 2 TIMES DAILY (route: oral)      MINOXIDIL TOP 1 tablet 2 TIMES DAILY (route: oral)      mv-mn/iron/folic acid/herb 190 (VITAMIN D3 COMPLETE ORAL) 1 capsule DAILY (route: oral)      oxybutynin (DITROPAN-XL) 10 MG 24 hr tablet Take 10 mg by  mouth once daily.      pantoprazole (PROTONIX) 20 MG tablet 1 tablet DAILY (route: oral)      pantoprazole (PROTONIX) 40 MG tablet TAKE ONE TABLET BY MOUTH ONCE DAILY FOR ACID REFLUX      POTASSIUM CHLORIDE ORAL Take 10 mEq by mouth.      sodium bicarbonate 650 MG tablet 1 tab Orally three times a day      tamsulosin (FLOMAX) 0.4 mg Cap TAKE ONE CAPSULE BY MOUTH ONCE DAILY FOR prostate      terazosin (HYTRIN) 2 MG capsule Take 2 mg by mouth.       No current facility-administered medications for this visit.       Review of patient's allergies indicates:   Allergen Reactions    Tramadol Other (See Comments)         Review of systems  CONSTITUTIONAL: no fevers, no chills, no weight loss, no fatigue, no weakness  HEMATOLOGIC: no abnormal bleeding, no abnormal bruising, no drenching night sweats  ONCOLOGIC: no new masses or lumps  HEENT: no vision loss, no tinnitus or hearing loss, no nose bleeding, no dysphagia, no odynophagia  CVS: no chest pain, no palpitations, no dyspnea on exertion  RESP: no shortness of breath, no hemoptysis, no cough  GI: no nausea, no vomiting, no diarrhea, no constipation, no melena, no hematochezia, no hematemesis, no abdominal pain, no increase in abdominal girth  : no dysuria, no hematuria, no hesitancy, no scrotal swelling, no discharge  INTEGUMENT: no rashes, no abnormal bruising, no nail pitting, no hyperpigmentation  NEURO: no falls, no memory loss, no paresthesias or dysesthesias, no urofecal incontinence or retention, no loss of strength on any extremity  MSK: no back pain, no new joint pain, no joint swelling  PSYCH: no suicidal or homicidal ideation, no depression, no insomnia, no anhedonia  ENDOCRINE: no heat or cold intolerance, no polyuria, no polydipsia      Physical Exam:  Vitals:    10/02/24 1434   BP: (!) 185/89   Pulse: 62   Resp: 14     GA: AAOx3, NAD, in his wheelchair, walks with a cane  HEENT: NCAT, moist oral mucous membranes  LYMPH: no cervical, axillary or  supraclavicular adenopathy  CVS:  Irregularly  heart rhythm, no murmurs or gallops  RESP:  Good bilateral air entry, no evidence of wheezing or rhonchi.  ABD: soft, NT, ND, BS+, no hepatosplenomegaly  EXT: no deformities, no pedal edema  SKIN: no rashes, warm and dry  NEURO: normal mentation, decreased strength in left upper and lower extremity        Assessment    # Microcytic anemia   # Thrombocytopenia  # MGUS    Discussed with patient the diagnosis of microcytic anemia, thrombocytopenia as well as differential diagnosis.  Discussed the need for further workup to narrow down differential which will dictate further treatment recommendations        Plan   CBC, CMP, smear, folic acid, vitamin B12, iron panel, hemoglobin electrophoresis, myeloma panel and ultrasound abdomen today   Plan to follow-up in 4-6 weeks with repeat CBC to discuss above workup and further treatment recommendations      A total of  45 minutes were spent in review of records, interpretation of test, coordination of care, discussion and counseling with the patient.        Portions of the record may have been created with voice recognition software. Occasional wrong-word or sound-a-like substitutions may have occurred due to the inherent limitations of voice recognition software. .

## 2024-10-16 ENCOUNTER — PATIENT MESSAGE (OUTPATIENT)
Dept: RESEARCH | Facility: HOSPITAL | Age: 74
End: 2024-10-16
Payer: MEDICARE

## 2024-11-21 DIAGNOSIS — D69.6 THROMBOCYTOPENIA: Primary | ICD-10-CM

## 2025-04-04 ENCOUNTER — TELEPHONE (OUTPATIENT)
Dept: CARDIOLOGY | Facility: HOSPITAL | Age: 75
End: 2025-04-04
Payer: MEDICARE

## 2025-04-04 NOTE — TELEPHONE ENCOUNTER
"Referral received today from Dr. Cardenas's office requesting Fistulogram in 2 weeks to eval/treat Left arm BC AVF to "salvage" access. Access history with Dr. Cardenas listed below. Records indicate an office visit on 4/3/25 describing AVF with multiple stenoses and 2 pseudoaneurysms. Patient was contacted to schedule procedure. Faxed instructions to HD center and Dr. Tomas's office.     3/12/24 creation of LUE RC AVF  7/9/24 ligation of RC AVF and creation of LUE BC AVF  10/29/24 AVF Transposition  2/18/25 Tunneled catheter removal   4/4/25 Insertion of Tunneled catheter  "

## 2025-04-22 ENCOUNTER — HOSPITAL ENCOUNTER (OUTPATIENT)
Facility: HOSPITAL | Age: 75
Discharge: HOME | End: 2025-04-22
Attending: STUDENT IN AN ORGANIZED HEALTH CARE EDUCATION/TRAINING PROGRAM | Admitting: STUDENT IN AN ORGANIZED HEALTH CARE EDUCATION/TRAINING PROGRAM
Payer: MEDICARE

## 2025-04-22 VITALS
OXYGEN SATURATION: 95 % | TEMPERATURE: 98 F | SYSTOLIC BLOOD PRESSURE: 179 MMHG | WEIGHT: 214.5 LBS | DIASTOLIC BLOOD PRESSURE: 87 MMHG | BODY MASS INDEX: 30.71 KG/M2 | RESPIRATION RATE: 18 BRPM | HEART RATE: 82 BPM | HEIGHT: 70 IN

## 2025-04-22 DIAGNOSIS — N18.6 END STAGE RENAL DISEASE: ICD-10-CM

## 2025-04-22 DIAGNOSIS — T82.590A MECHANICAL COMPLICATION OF ARTERIOVENOUS FISTULA SURGICALLY CREATED, INITIAL ENCOUNTER: ICD-10-CM

## 2025-04-22 LAB
ANION GAP SERPL CALC-SCNC: 17 MMOL/L (ref 8–16)
BUN SERPL-MCNC: 47 MG/DL (ref 6–30)
CHLORIDE SERPL-SCNC: 103 MMOL/L (ref 95–110)
CREAT SERPL-MCNC: 12.8 MG/DL (ref 0.5–1.4)
GLUCOSE SERPL-MCNC: 85 MG/DL (ref 70–110)
HCT VFR BLD CALC: 28 %PCV (ref 36–54)
HGB BLD-MCNC: 10 G/DL
POC IONIZED CALCIUM: 1.14 MMOL/L (ref 1.06–1.42)
POC TCO2 (MEASURED): 21 MMOL/L (ref 23–29)
POTASSIUM BLD-SCNC: 4.7 MMOL/L (ref 3.5–5.1)
SAMPLE: ABNORMAL
SODIUM BLD-SCNC: 135 MMOL/L (ref 136–145)

## 2025-04-22 PROCEDURE — 63600175 PHARM REV CODE 636 W HCPCS: Performed by: STUDENT IN AN ORGANIZED HEALTH CARE EDUCATION/TRAINING PROGRAM

## 2025-04-22 PROCEDURE — 99152 MOD SED SAME PHYS/QHP 5/>YRS: CPT | Performed by: STUDENT IN AN ORGANIZED HEALTH CARE EDUCATION/TRAINING PROGRAM

## 2025-04-22 PROCEDURE — 99213 OFFICE O/P EST LOW 20 MIN: CPT | Mod: 25,,, | Performed by: STUDENT IN AN ORGANIZED HEALTH CARE EDUCATION/TRAINING PROGRAM

## 2025-04-22 PROCEDURE — 36901 INTRO CATH DIALYSIS CIRCUIT: CPT | Mod: LT | Performed by: STUDENT IN AN ORGANIZED HEALTH CARE EDUCATION/TRAINING PROGRAM

## 2025-04-22 PROCEDURE — C1894 INTRO/SHEATH, NON-LASER: HCPCS | Performed by: STUDENT IN AN ORGANIZED HEALTH CARE EDUCATION/TRAINING PROGRAM

## 2025-04-22 PROCEDURE — 99152 MOD SED SAME PHYS/QHP 5/>YRS: CPT | Mod: ,,, | Performed by: STUDENT IN AN ORGANIZED HEALTH CARE EDUCATION/TRAINING PROGRAM

## 2025-04-22 PROCEDURE — C1769 GUIDE WIRE: HCPCS | Performed by: STUDENT IN AN ORGANIZED HEALTH CARE EDUCATION/TRAINING PROGRAM

## 2025-04-22 PROCEDURE — 76937 US GUIDE VASCULAR ACCESS: CPT | Mod: 26,,, | Performed by: STUDENT IN AN ORGANIZED HEALTH CARE EDUCATION/TRAINING PROGRAM

## 2025-04-22 PROCEDURE — C1725 CATH, TRANSLUMIN NON-LASER: HCPCS | Performed by: STUDENT IN AN ORGANIZED HEALTH CARE EDUCATION/TRAINING PROGRAM

## 2025-04-22 PROCEDURE — 36907 BALO ANGIOP CTR DIALYSIS SEG: CPT | Mod: LT | Performed by: STUDENT IN AN ORGANIZED HEALTH CARE EDUCATION/TRAINING PROGRAM

## 2025-04-22 PROCEDURE — 25500020 PHARM REV CODE 255: Performed by: STUDENT IN AN ORGANIZED HEALTH CARE EDUCATION/TRAINING PROGRAM

## 2025-04-22 PROCEDURE — 99153 MOD SED SAME PHYS/QHP EA: CPT | Performed by: STUDENT IN AN ORGANIZED HEALTH CARE EDUCATION/TRAINING PROGRAM

## 2025-04-22 PROCEDURE — 76937 US GUIDE VASCULAR ACCESS: CPT | Performed by: STUDENT IN AN ORGANIZED HEALTH CARE EDUCATION/TRAINING PROGRAM

## 2025-04-22 PROCEDURE — 36901 INTRO CATH DIALYSIS CIRCUIT: CPT | Mod: LT,,, | Performed by: STUDENT IN AN ORGANIZED HEALTH CARE EDUCATION/TRAINING PROGRAM

## 2025-04-22 PROCEDURE — 36907 BALO ANGIOP CTR DIALYSIS SEG: CPT | Mod: LT,,, | Performed by: STUDENT IN AN ORGANIZED HEALTH CARE EDUCATION/TRAINING PROGRAM

## 2025-04-22 PROCEDURE — 27201423 OPTIME MED/SURG SUP & DEVICES STERILE SUPPLY: Performed by: STUDENT IN AN ORGANIZED HEALTH CARE EDUCATION/TRAINING PROGRAM

## 2025-04-22 RX ORDER — MIDAZOLAM HYDROCHLORIDE 1 MG/ML
INJECTION INTRAMUSCULAR; INTRAVENOUS
Status: DISCONTINUED | OUTPATIENT
Start: 2025-04-22 | End: 2025-04-22 | Stop reason: HOSPADM

## 2025-04-22 RX ORDER — FENTANYL CITRATE 50 UG/ML
INJECTION, SOLUTION INTRAMUSCULAR; INTRAVENOUS
Status: DISCONTINUED | OUTPATIENT
Start: 2025-04-22 | End: 2025-04-22 | Stop reason: HOSPADM

## 2025-04-22 RX ORDER — SODIUM CHLORIDE 0.9 % (FLUSH) 0.9 %
10 SYRINGE (ML) INJECTION
Status: DISCONTINUED | OUTPATIENT
Start: 2025-04-22 | End: 2025-04-22 | Stop reason: HOSPADM

## 2025-04-22 RX ORDER — LIDOCAINE HYDROCHLORIDE 10 MG/ML
INJECTION, SOLUTION INFILTRATION; PERINEURAL
Status: DISCONTINUED | OUTPATIENT
Start: 2025-04-22 | End: 2025-04-22 | Stop reason: HOSPADM

## 2025-04-22 RX ORDER — IOPAMIDOL 612 MG/ML
INJECTION, SOLUTION INTRAVASCULAR
Status: DISCONTINUED | OUTPATIENT
Start: 2025-04-22 | End: 2025-04-22 | Stop reason: HOSPADM

## 2025-04-22 RX ORDER — PHENAZOPYRIDINE HYDROCHLORIDE 200 MG/1
200 TABLET, FILM COATED ORAL 3 TIMES DAILY PRN
COMMUNITY

## 2025-04-22 NOTE — PLAN OF CARE
DC instructions reviewed with patient; verbalized understanding. DC home with no complications. Left in wheelchair.

## 2025-04-22 NOTE — Clinical Note
50 ml of contrast were injected throughout the case. 40 mL of contrast was the total wasted during the case. 90 mL was the total amount used during the case.

## 2025-04-22 NOTE — H&P
INTERVENTIONAL NEPHROLOGY HISTORY & PHYSICAL       Patient Name: Jeovanny Leigh  RIKY 1950    Date: 2025  Time: 9:28 AM         HPI: 75 y.o. male with ESRD on HD via left brachiocephalic arteriovenous fistula who presents with difficulty with cannulation, multiple infiltrations d/t malpositioned needle sticks, and decreased access flows. 3/12/24 creation of LUE RC AVF, 24 ligation of RC AVF and creation of LUE BC AVF, 10/29/24 AVF transposition, 25 TDC removal, 25 TDC insertion d/t difficulty cannulating left BC AVF. Pt is being prepared for fistulogram with possible intervention today.    Pt seen and examined at bedside in [facility] this AM. Family is present. Risks and benefits of fistulogram with possible intervention and intravenous conscious sedation was reviewed with the patient. The patient agrees to proceed with the intended procedure. Consents for both intravenous conscious sedation and procedure were signed and placed within the chart.       Review of Systems:  General:  No fatigue  Skin: No rashes  HEENT: No vision changes  CVS: No CP  RS: No SOB  GIT: No abdominal pain  Extremities: No swelling  Neurological:  No focal weakness  Psych: No depression    Past Medical History:   Diagnosis Date    Dyslipidemia     HTN (hypertension)     Stroke       Past Surgical History:   Procedure Laterality Date    AV FISTULA PLACEMENT, BRACHIOCEPHALIC Left 2024    Dr. Kirk    AV FISTULA PLACEMENT, RADIOCEPHALIC Left 2024    Dr. Kirk    INSERTION OF TUNNELED CATHETER N/A 2025    DR. KIRK    LIGATION OF ARTERIOVENOUS FISTULA Left 2024    LIGATION OF LUE RC AVF - DR. KIRK    NECK SURGERY      REMOVAL OF TUNNELED CENTRAL VENOUS CATHETER ([facility]) N/A 2025    DR. KIRK      Review of patient's allergies indicates:   Allergen Reactions    Tramadol Other (See Comments)      Social History[1]   Family History   Problem Relation Name Age of Onset    Stroke Mother       Heart attack Brother      Stroke Maternal Grandmother      Heart attack Paternal Grandfather         Current Medications[2]    Vital Signs:  Temp:  [97.7 °F (36.5 °C)] 97.7 °F (36.5 °C)  Pulse:  [74-79] 79  Resp:  [18] 18  SpO2:  [95 %-97 %] 95 %  BP: (193-211)/() 193/102     Physical Exam:  General: NAD  HEENT: NC/AT, EOMI  CVS: RRR.  RS: breathing easily.  Abdominal: Soft, NT/ND.  Extremities: No edema b/l LE  Skin: No rash, no lesions.  Neurological: No focal deficits.  Psych: Normal affect  Dialysis Access: left brachiocephalic arteriovenous fistula with pulsatility throughout BOF and JA, thrill noted at dBOF followed by pulsatility    Results:    Lab Results   Component Value Date     05/18/2023    K 4.2 05/18/2023     05/18/2023    CO2 21 (L) 05/18/2023    BUN 45.6 (H) 05/18/2023    CREATININE 6.39 (H) 05/18/2023     Lab Results   Component Value Date    WBC 5.36 05/18/2023    HGB 8.2 (L) 05/18/2023     05/18/2023    MCV 69.2 (L) 05/18/2023       Assessment and Plan:      ESRD on HD via left brachiocephalic arteriovenous fistula.  Mechanical complication of AVF.  Pt with ESRD on HD via left brachiocephalic arteriovenous fistula who presents today with difficulty with cannulation. The pt is being prepared for fistulogram with possible intervention today.  - Consents obtained and placed within chart.  - Will proceed in cath lab setting today.    Please feel free to reach me with any questions.    Rad Boyle MD  Interventional Nephrology  Cell: [phone]       [1]   Social History  Tobacco Use    Smoking status: Former    Smokeless tobacco: Never   Substance Use Topics    Alcohol use: Not Currently    Drug use: Never   [2]   Current Facility-Administered Medications:     sodium chloride 0.9% flush 10 mL, 10 mL, Intravenous, PRN, Hasan, Larry, DO

## 2025-04-22 NOTE — PROCEDURES
INTERVENTIONAL NEPHROLOGY PROCEDURE NOTE: FISTULOGRAM/GRAFTOGRAM         Patient Name: Jeovanny MARKHAMO.B. 1950    Procedure Date:    2025    Performing Physician:   Dr. Mascorro and Dr. Boyle    Access History: Pt is with ESRD on HD typically via left brachiocephalic arteriovenous fistula who presents today with difficulty with cannulation.    Pre-Op Diagnosis: T82.590A Mechanical complication of surgically created arteriovenous shunt, initial encounter, N18.6 End Stage Renal Disease (ESRD)  Post-Op Diagnosis: Same    Procedure: Fistulogram with possible angioplasty and stent placement.    Indication: Nonfunctional/Dysfunctional/Malfunctioning HD access.    Informed Consent:  The patient was evaluated in the pre-operative area with assessment including the American Society of Anesthesia risk classification. The procedure is discussed in detail including risks, benefits alternatives and options and the patient agrees to proceed. Informed consent was obtained from the patient.     Maximum sterile barrier technique: The patient was prepped and draped using chlorhexidine prep and maximum sterile barrier technique.    Sedation Note:  Risks and benefits of sedation were reviewed with the patient or surrogate, including bleeding, infection, nausea, vomiting, dizziness, instability, damage to a nerve, damage to a blood vessel, cellulitis, reaction to medications, amnesia, loss of consciousness, respiratory arrest, cardiac arrest.     The patient received the following medications: Versed 1.5 mg IV and Fentanyl 75 mcg IV; patient did remain alert, responsive, and conversational throughout the procedure. I was personally responsible for supervising the administration of moderate sedation services during the procedure performed and I affirm all the guidelines and requirements described in the CPT 2025 section on moderate sedation were followed, including the use of an independent trained observer who  had no other duties during the procedure. The total face-to-face time was 43 minutes.    Procedure Steps:     The patient was prepped and draped in sterile fashion. Procedure ultrasound revealed patent vascular HD access.    Local anesthesia was administered by injecting 1% lidocaine at the intended site of cannulation. With use of live ultrasonographic visualization, the vascular access was successfully cannulated with a mini-stick needle aimed towards the outflow (image saved to chart). After blood flashback was noted, the mini-stick wire was advanced through the needle. Via Seldinger technique, the needle was exchanged for the mini-stick sheath. Angiograms were completed which revealed #2 lesion(s) (summarized below). A 150 cm glide wire was advanced through the mini-stick sheath with the tip parked in the superior vena cava. A 6 Fr sheath was exchanged via Seldinger technique for the mini-stick sheath.    Several collateral veins were noted on central angiogram with retrograde flow from the left innominate vein upwards.  Retrograde angiogram showed patent inflow segment without notable lesions.    #1 Angioplasty was performed at the left innominate vein using a BD Ultraverse 10 mm x 60 mm balloon. Approximately 100% effacement was obtained at 5-10 SHIRAZ and was held for 1-5 seconds. Post-angioplasty angiogram revealed 0% residual stenosis.    Lesions were treated in the following order: #1.    Wire and sheath were removed and hemostasis was achieved using a purse-string stitch and light digital pressure at the site of cannulation.    ASSESSMENT/PLAN:  - Successful angioplasty of the SVC, right and left innominate veins, and left subclavian vein  - Angioplasty was not performed at the [facility] given the fistula is unlikely to be successfully used long-term  - Recommended referral back to Dr. Tomas, surgery, for evaluation of new access creation  - Case was discussed with Dr. Larry Mascorro    EBL: 2 ml    Contrast: 50  ml    Complications: None    Post-op Instructions: The patient was given both verbal and written post-op instructions. If excessive bleeding at the site, they have been instructed to call their physician or proceed to a local emergency room.    Orders to the dialysis unit: OK to use access for dialysis needs.    Thank you for allowing me the opportunity in taking care of this patient. Please reach me with any questions.    Rad Boyle MD  Interventional Nephrology  Cell: [phone]

## 2025-04-22 NOTE — Clinical Note
The balloon was inflated with indeflator in the  . The balloon max pressure was 6 gerri for 20 seconds

## 2025-04-22 NOTE — DISCHARGE SUMMARY
INTERVENTIONAL NEPHROLOGY DISCHARGE SUMMARY         Patient Name: Jeovanny Leigh   1950    Procedure Date: 2025      In brief, Mr. Leigh underwent fistulogram of his L BC AVF as second opinion given difficult cannulation and infiltrations. The pt was also found to have mild-moderate arm swelling. Angiogram revealed a few pseudoaneurysms along the the cannulation zone, but revealed complex cephalic arch anatomy and central vein anatomy. It appears that blood flow preferentially flows through collateral systems that abut from the left subclavian vein and left innominate vein. There was concern for stenosis in either one of these veins, though none were specifically identified with the several angiograms we completed of the area. We did place a balloon in the central veins along all these locations, only achieving a small waist in the left innominate vein. Despite our efforts, contrast desired to flow in the collateral vessels.     Given this, we determined that treating the other stenotic lesions would be futile as no dependable outflow would accept a significant amount of augmented inflow.    As a result, our recommendation would be to consider ligation of the current fistula. What happens thereafter is surgeon preference, but there could be consideration for placement of a HeRO graft from the left-sided approached, though this could ultimately lead to worsening arm swelling. A fistula could be placed at the right arm, but vein mapping should occur with both ultrasound and formal venogram to ensure central vasculature from the right side is patent.     The catheter, for now, should remain the mainstay for HD treatments until a surgical plan has been developed.    This plan was reviewed with Dr. Mascorro who was present throughout the care of the patient during this outpatient visit.    Pre-Op Diagnosis: T82.590A Mechanical complication of surgically created arteriovenous shunt, initial  encounter, N18.6 End Stage Renal Disease (ESRD)  Post-Op Diagnosis: Same.    Discharge Instructions/Recommendations:  - Continue use of catheter for HD.  - Pt may be discharged after successful monitoring in post-op area.  - Pt may resume home medications.    Thank you for allowing me the opportunity in taking care of this patient. Please reach me with any questions.    Larry Mascorro DO  Interventional Nephrology  Cell: [phone]    Rad Boyle MD  Interventional Nephrology  Cell: [phone]

## (undated) DEVICE — FLOWSWITCH HP 1-W W/O LL

## (undated) DEVICE — KIT MINI STK MAX COAX 5FR 10CM

## (undated) DEVICE — DRAPE UTILITY W/ TAPE 20X30IN

## (undated) DEVICE — DRESSING LEUKOPLAST FLEX 1X3IN

## (undated) DEVICE — PRESTO INFLATION DEVICE

## (undated) DEVICE — DRAPE C-ARM COVER EZ 36X28IN

## (undated) DEVICE — SHEATH PINNACLE 6FR HIFLO

## (undated) DEVICE — SUT MONOCRYL 3-0 PS-2 UND

## (undated) DEVICE — CATH ULTRAVERSE 035 10X60X75

## (undated) DEVICE — GUIDEWIRE STD .035X180CM ANG

## (undated) DEVICE — COVER PROBE US 5.5X58L NON LTX

## (undated) DEVICE — CATH GLIDE ANGLED 5FR 65CM